# Patient Record
Sex: MALE | URBAN - METROPOLITAN AREA
[De-identification: names, ages, dates, MRNs, and addresses within clinical notes are randomized per-mention and may not be internally consistent; named-entity substitution may affect disease eponyms.]

---

## 2018-02-21 ENCOUNTER — HOSPITAL ENCOUNTER (OUTPATIENT)
Dept: RADIOLOGY | Facility: HOSPITAL | Age: 33
Discharge: HOME | End: 2018-02-21
Attending: INTERNAL MEDICINE

## 2024-08-20 DIAGNOSIS — R06.83 SNORING: ICD-10-CM

## 2024-08-20 DIAGNOSIS — R53.83 FATIGUE: ICD-10-CM

## 2024-08-20 DIAGNOSIS — G47.33 OSA (OBSTRUCTIVE SLEEP APNEA): Primary | ICD-10-CM

## 2024-08-20 DIAGNOSIS — G47.19 EXCESSIVE DAYTIME SLEEPINESS: ICD-10-CM

## 2024-08-30 ENCOUNTER — PROCEDURE VISIT (OUTPATIENT)
Dept: SLEEP MEDICINE | Facility: HOSPITAL | Age: 39
End: 2024-08-30
Attending: INTERNAL MEDICINE
Payer: OTHER GOVERNMENT

## 2024-08-30 DIAGNOSIS — R06.83 SNORING: ICD-10-CM

## 2024-08-30 DIAGNOSIS — R53.83 FATIGUE: ICD-10-CM

## 2024-08-30 DIAGNOSIS — G47.19 EXCESSIVE DAYTIME SLEEPINESS: ICD-10-CM

## 2024-08-30 DIAGNOSIS — G47.33 OSA (OBSTRUCTIVE SLEEP APNEA): ICD-10-CM

## 2024-08-30 PROCEDURE — 95806 SLEEP STUDY UNATT&RESP EFFT: CPT

## 2025-01-03 ENCOUNTER — TELEPHONE (OUTPATIENT)
Dept: PULMONOLOGY | Facility: CLINIC | Age: 40
End: 2025-01-03

## 2025-01-03 NOTE — TELEPHONE ENCOUNTER
----- Message from Kaz sent at 1/3/2025  1:22 PM CST -----  Type:  Sooner Appointment Request  Caller is requesting a sooner appointment.  Caller declined first available appointment listed below.  Caller will not accept being placed on the waitlist and is requesting a message be sent to doctor.    Name of Caller:  pt    When is the first available appointment?  Not soon enough  Best Call Back Number:  [phone](after 4:30pm) or [phone](during the business day)    Additional Information:  Pt's provider has retired and left him a cpap machine for hid sleep apnea. His insurance requires him to be seen by dr or he will have to pay out of pocket. His dr told him to get a [facility] dr,as all [facility] records could be transferred to [facility]. Dr. Bradshaw is his name.  Please call back to advise, thanks!

## 2025-01-31 ENCOUNTER — OFFICE VISIT (OUTPATIENT)
Dept: PULMONOLOGY | Facility: CLINIC | Age: 40
End: 2025-01-31
Payer: OTHER GOVERNMENT

## 2025-01-31 VITALS
SYSTOLIC BLOOD PRESSURE: 130 MMHG | HEIGHT: 70 IN | WEIGHT: 218.94 LBS | BODY MASS INDEX: 31.34 KG/M2 | HEART RATE: 72 BPM | DIASTOLIC BLOOD PRESSURE: 92 MMHG | OXYGEN SATURATION: 98 %

## 2025-01-31 DIAGNOSIS — G47.33 OBSTRUCTIVE SLEEP APNEA ON CPAP: ICD-10-CM

## 2025-01-31 DIAGNOSIS — J45.50 SEVERE PERSISTENT ASTHMA WITHOUT COMPLICATION: Primary | ICD-10-CM

## 2025-01-31 DIAGNOSIS — L30.9 DERMATITIS: ICD-10-CM

## 2025-01-31 PROCEDURE — 99204 OFFICE O/P NEW MOD 45 MIN: CPT | Mod: S$GLB,,, | Performed by: NURSE PRACTITIONER

## 2025-01-31 PROCEDURE — 99999 PR PBB SHADOW E&M-EST. PATIENT-LVL III: CPT | Mod: PBBFAC,,, | Performed by: NURSE PRACTITIONER

## 2025-01-31 RX ORDER — PREDNISONE 10 MG/1
TABLET ORAL
Qty: 18 TABLET | Refills: 0 | Status: SHIPPED | OUTPATIENT
Start: 2025-01-31

## 2025-01-31 RX ORDER — BUSPIRONE HYDROCHLORIDE 10 MG/1
10 TABLET ORAL 2 TIMES DAILY PRN
COMMUNITY

## 2025-01-31 RX ORDER — ALBUTEROL SULFATE AND BUDESONIDE 90; 80 UG/1; UG/1
2 AEROSOL, METERED RESPIRATORY (INHALATION) EVERY 4 HOURS PRN
Qty: 10.7 G | Refills: 11 | Status: SHIPPED | OUTPATIENT
Start: 2025-01-31

## 2025-01-31 RX ORDER — CLOTRIMAZOLE AND BETAMETHASONE DIPROPIONATE 10; .64 MG/G; MG/G
CREAM TOPICAL 2 TIMES DAILY
Qty: 45 G | Refills: 2 | Status: SHIPPED | OUTPATIENT
Start: 2025-01-31

## 2025-01-31 NOTE — PROGRESS NOTES
"1/31/2025    Anshul Kent      Chief Complaint   Patient presents with    Sleep Apnea       HPI: 1/31/2025- established patient Dr. Bradshaw, dx sleep apnea 8/30/24 with AHI 11.3. compliance report reviewed 11/11/24-12/10/24 with >4 hours 70%.  Complaint of water pooling on face mask.     Has noticed improvement in daytime fatigue, improved mental grogginess. Snoring improved.     Social Hx: lives with family no pets, currently working in office setting, no known Asbestosis exposure, no Smoking Hx  Family Hx: no Lung Cancer, no COPD, no Asthma, Brother JEAN CARLOS  Medical Hx: no previous pneumonia ; no previous shoulder/chest surgery      The chief compliant  problem is new to me  PFSH:  No past medical history on file.      No past surgical history on file.  Social History     Tobacco Use    Smoking status: Never    Smokeless tobacco: Never     No family history on file.  Review of patient's allergies indicates:  No Known Allergies  I have reviewed past medical, family, and social history. I have reviewed previous nurse notes.        Performance Status:The patient's activity level is no limits with regular activity.      Review of Systems:  a review of eleven systems covering constitutional, Eye, HEENT, Psych, Respiratory, Cardiac, GI, , Musculoskeletal, Endocrine, Dermatologic was negative except for pertinent findings as listed ABOVE and below: pertinent positive as above, rest is good           Exam:Comprehensive exam done. BP (!) 130/92 (BP Location: Right arm, Patient Position: Sitting)   Pulse 72   Ht 5' 10" (1.778 m)   Wt 99.3 kg (218 lb 14.7 oz)   SpO2 98% Comment: on room air at rest  BMI 31.41 kg/m²   Exam included Vitals as listed, and patient's appearance and affect and alertness and mood, oral exam for yeast and hygiene and pharynx lesions and Mallapatti (M) score, neck with inspection for jvd and masses and thyroid abnormalities and lymph nodes (supraclavicular and infraclavicular nodes and axillary " also examined and noted if abn), chest exam included symmetry and effort and fremitus and percussion and auscultation, cardiac exam included rhythm and gallops and murmur and rubs and jvd and edema, abdominal exam for mass and hepatosplenomegaly and tenderness and hernias and bowel sounds, Musculoskeletal exam with muscle tone and posture and mobility/gait and  strength, and skin for rashes and cyanosis and pallor and turgor, extremity for clubbing.  Findings were normal except for pertinent findings listed below:   M2   Breath sounds clear    Radiographs (ct chest and cxr) reviewed: Available Care Everywhere Reports reviewed  patient imaging studies reviewed and interpreted independently. My personal interpretation of most resent images include:      XR CHEST_STENNIS PERFORMED 02/27/2018 lungs clear    Labs: Patients labs reviewed including CBC and CMP  reviewed             Plan:  Clinical impression is apparently straight forward and impression with management as below.    Anshul was seen today for sleep apnea.    Diagnoses and all orders for this visit:    Severe persistent asthma without complication  -     predniSONE (DELTASONE) 10 MG tablet; Take one pill a day for three days, repeat for shortness of breath  -     albuterol-budesonide (AIRSUPRA) 90-80 mcg/actuation; Inhale 2 puffs into the lungs every 4 (four) hours as needed (cough).    Dermatitis  -     clotrimazole-betamethasone 1-0.05% (LOTRISONE) cream; Apply topically 2 (two) times daily.    Obstructive sleep apnea on CPAP  -     CPAP/BIPAP SUPPLIES        Follow up in about 1 year (around 1/31/2026), or if symptoms worsen or fail to improve.      Discussed with patient above for education the following:      Patient Instructions   Changing face mask to full face mask    Asthma Action plan    Prednisone 10 mg pills, Take one pill a day for three days, repeat for shortness of breath or wheeze    Airsupra Inhaler 1-2 puffs every 4 hours, for cough or  shortness of breath    Use cream for dermatitis flare ups

## 2025-01-31 NOTE — PATIENT INSTRUCTIONS
Changing face mask to full face mask    Asthma Action plan    Prednisone 10 mg pills, Take one pill a day for three days, repeat for shortness of breath or wheeze    Airsupra Inhaler 1-2 puffs every 4 hours, for cough or shortness of breath    Use cream for dermatitis flare ups

## 2025-02-18 ENCOUNTER — TELEPHONE (OUTPATIENT)
Dept: PULMONOLOGY | Facility: CLINIC | Age: 40
End: 2025-02-18
Payer: OTHER GOVERNMENT